# Patient Record
Sex: FEMALE | Race: WHITE | NOT HISPANIC OR LATINO | Employment: FULL TIME | URBAN - METROPOLITAN AREA
[De-identification: names, ages, dates, MRNs, and addresses within clinical notes are randomized per-mention and may not be internally consistent; named-entity substitution may affect disease eponyms.]

---

## 2023-02-20 ENCOUNTER — EVALUATION (OUTPATIENT)
Dept: PHYSICAL THERAPY | Facility: CLINIC | Age: 53
End: 2023-02-20

## 2023-02-20 DIAGNOSIS — M54.41 CHRONIC MIDLINE LOW BACK PAIN WITH RIGHT-SIDED SCIATICA: Primary | ICD-10-CM

## 2023-02-20 DIAGNOSIS — G89.29 CHRONIC MIDLINE LOW BACK PAIN WITH RIGHT-SIDED SCIATICA: Primary | ICD-10-CM

## 2023-02-20 NOTE — LETTER
2023    Rebeka Ellison MD  [address] [facility] [address]    Patient: Sammy Felton   YOB: 1970   Date of Visit: 2023     Encounter Diagnosis     ICD-10-CM    1  Chronic midline low back pain with right-sided sciatica  M54 41     G89 29           Dear Dr Don Wood: Thank you for your recent referral of Sammy Felton  Please review the attached evaluation summary from  [facility] recent visit  Please verify that you agree with the plan of care by signing the attached order  If you have any questions or concerns, please do not hesitate to call  I sincerely appreciate the opportunity to share in the care of one of your patients and hope to have another opportunity to work with you in the near future  Sincerely,    Brandie Bahena, PT, DPT       Referring Provider:      I certify that I have read the below Plan of Care and certify the need for these services furnished under this plan of treatment while under my care  Rebeka Ellison MD  [address] [facility] [address]  Via Fax: [phone]          PT Evaluation     Today's date: 2023  Patient name: Sammy Felton  : 1970  MRN: [id number]  Referring provider: Michaela Marie MD  Dx:   Encounter Diagnosis     ICD-10-CM    1  Chronic midline low back pain with right-sided sciatica  M54 41     G89 29           Start Time: 1715  Stop Time: 1800  Total time in clinic (min): 45 minutes    Assessment  Assessment details: Pt is a 47 y o  female presenting to PT services with c/o chronic LBP  Differential diagnosis list includes: lumbar radiculopathy vs  spinal stenosis  Pt responded favorably to repeated forward flexion with decreased intensity of pain and centralization of symptoms to L thigh from L shin  Pt has weakness in posterior chain of BLE  Pt has difficulty activating transverse abdominis without compensation of diaphragm   PT added repeated lumbar flexion, supine double knee to chest, and TA contraction to pt's HEP, pt verbalized and demonstrated understanding of proper form  Pt is a good candidate for skilled physical therapy in order to improve low back pain, resolve radicular symptoms, and increase functional ability  Impairments: abnormal or restricted ROM, activity intolerance, impaired balance, impaired physical strength, lacks appropriate home exercise program and pain with function  Barriers to therapy: High co-pay    Goals  STG (3 weeks):  1  Pt will improve lumbar AROM to be WNL  2  Pt will improve b/l hip extension strength to be at least 4+/5  3  Pt will improve b/l knee flexion strength to be at least 4+/5   4  Pt will report no radicular symptoms past R knee  LTG (6 weeks):  1  Pt will be independent in HEP  2  Pt will report no radicular symptoms  3  Pt will improve TA activation evidenced by palpable contraction while breathing  4  Pt will report pain at worst as 3/10 or less     Plan  Patient would benefit from: skilled physical therapy  Planned modality interventions: biofeedback, cryotherapy, TENS, thermotherapy: hydrocollator packs and unattended electrical stimulation  Other planned modality interventions: IASTM, MFDc  Planned therapy interventions: abdominal trunk stabilization, joint mobilization, massage, manual therapy, neuromuscular re-education, patient education, strengthening, stretching, therapeutic activities, therapeutic exercise, home exercise program, functional ROM exercises, flexibility and balance  Frequency: 1x week  Duration in weeks: 6  Plan of Care beginning date: 2/20/2023  Plan of Care expiration date: 4/7/2023  Treatment plan discussed with: patient        Subjective Evaluation    History of Present Illness  Mechanism of injury: Pt reports that she is taking Gabapentin currently for the pain she is experiencing, she only takes it at night  Pt states that she has pain in the R leg   Pt states that she has had this pain since , she cannot cite an inciting incident  She has had this pain before but has not had it for years  She states that any time she has had this before, it has lasted for only 2-3 days after taking the Medrol dose pack and this time it is not going away  She has had back issues for about 12 years  She states that the pain is better on the weekends and in the morning because she sits for a long period of time at work  She has had lumbar epidural injections in 2017 and it seemed to help a lot      Pain  Current pain ratin  At best pain ratin  At worst pain ratin  Location: R shin, R thigh, R hip  Quality: dull ache, burning and needle-like  Relieving factors: ice and support (Gabapentin, ibuprofen, pillow between legs)  Exacerbated by: prolonged sitting     Social Support  Steps to enter house: no  Stairs in house: yes (12 steps up, 1 flight down)   Lives in: multiple-level home  Lives with: spouse (mother in law)    Employment status: working ( at [employer])  Hand dominance: right    Patient Goals  Patient goals for therapy: decreased pain, improved balance and increased strength  Patient goal: "to get the MRI, to make the pain go away "         Objective     Joint Play     Hypomobile: T8, T9, T10, T11, T12, L1, L2, L3, L4, L5 and S1     Pain: L1, L2, L3, L4 and L5   Mechanical Assessment    Cervical      Thoracic    Seated flexion: repeated movements  Pain location: centralized  Pain intensity: better    Lumbar    Standing extension: repeated movements  Pain intensity: worse    Strength/Myotome Testing     Left Hip   Planes of Motion   Flexion: 4+  Extension: 4  Abduction: 5    Right Hip   Planes of Motion   Flexion: 4+  Extension: 4  Abduction: 5    Left Knee   Flexion: 5  Extension: 5    Right Knee   Flexion: 5  Extension: 5    Left Ankle/Foot   Dorsiflexion: 5  Plantar flexion: 5    Right Ankle/Foot   Dorsiflexion: 5  Plantar flexion: 5            Precautions: None  Access Code: [email or web address]       Manuals 2/20                                                                Neuro Re-Ed             TA contraction 5"x20                                                                                          Ther Ex             Bike              Repeated lumbar flexion x30            DKTC 3x10                                                                             Ther Activity                                       Gait Training                                       Modalities

## 2023-02-20 NOTE — PROGRESS NOTES
PT Evaluation     Today's date: 2023  Patient name: Mansi English  : 1970  MRN: [id number]  Referring provider: Vijay Chaves MD  Dx:   Encounter Diagnosis     ICD-10-CM    1  Chronic midline low back pain with right-sided sciatica  M54 41     G89 29           Start Time: 1715  Stop Time: 1800  Total time in clinic (min): 45 minutes    Assessment  Assessment details: Pt is a 47 y o  female presenting to PT services with c/o chronic LBP  Differential diagnosis list includes: lumbar radiculopathy vs  spinal stenosis  Pt responded favorably to repeated forward flexion with decreased intensity of pain and centralization of symptoms to L thigh from L shin  Pt has weakness in posterior chain of BLE  Pt has difficulty activating transverse abdominis without compensation of diaphragm  PT added repeated lumbar flexion, supine double knee to chest, and TA contraction to pt's HEP, pt verbalized and demonstrated understanding of proper form  Pt is a good candidate for skilled physical therapy in order to improve low back pain, resolve radicular symptoms, and increase functional ability  Impairments: abnormal or restricted ROM, activity intolerance, impaired balance, impaired physical strength, lacks appropriate home exercise program and pain with function  Barriers to therapy: High co-pay    Goals  STG (3 weeks):  1  Pt will improve lumbar AROM to be WNL  2  Pt will improve b/l hip extension strength to be at least 4+/5  3  Pt will improve b/l knee flexion strength to be at least 4+/5   4  Pt will report no radicular symptoms past R knee  LTG (6 weeks):  1  Pt will be independent in HEP  2  Pt will report no radicular symptoms  3  Pt will improve TA activation evidenced by palpable contraction while breathing  4   Pt will report pain at worst as 3/10 or less     Plan  Patient would benefit from: skilled physical therapy  Planned modality interventions: biofeedback, cryotherapy, TENS, thermotherapy: hydrocollator packs and unattended electrical stimulation  Other planned modality interventions: IASTM, MFDc  Planned therapy interventions: abdominal trunk stabilization, joint mobilization, massage, manual therapy, neuromuscular re-education, patient education, strengthening, stretching, therapeutic activities, therapeutic exercise, home exercise program, functional ROM exercises, flexibility and balance  Frequency: 1x week  Duration in weeks: 6  Plan of Care beginning date: 2023  Plan of Care expiration date: 2023  Treatment plan discussed with: patient        Subjective Evaluation    History of Present Illness  Mechanism of injury: Pt reports that she is taking Gabapentin currently for the pain she is experiencing, she only takes it at night  Pt states that she has pain in the R leg  Pt states that she has had this pain since , she cannot cite an inciting incident  She has had this pain before but has not had it for years  She states that any time she has had this before, it has lasted for only 2-3 days after taking the Medrol dose pack and this time it is not going away  She has had back issues for about 12 years  She states that the pain is better on the weekends and in the morning because she sits for a long period of time at work  She has had lumbar epidural injections in 2017 and it seemed to help a lot      Pain  Current pain ratin  At best pain ratin  At worst pain ratin  Location: R shin, R thigh, R hip  Quality: dull ache, burning and needle-like  Relieving factors: ice and support (Gabapentin, ibuprofen, pillow between legs)  Exacerbated by: prolonged sitting     Social Support  Steps to enter house: no  Stairs in house: yes (12 steps up, 1 flight down)   Lives in: multiple-level home  Lives with: spouse (mother in law)    Employment status: working ( at [employer])  Hand dominance: right    Patient Goals  Patient goals for therapy: decreased pain, improved balance and increased strength  Patient goal: "to get the MRI, to make the pain go away "         Objective     Joint Play     Hypomobile: T8, T9, T10, T11, T12, L1, L2, L3, L4, L5 and S1     Pain: L1, L2, L3, L4 and L5   Mechanical Assessment    Cervical      Thoracic    Seated flexion: repeated movements  Pain location: centralized  Pain intensity: better    Lumbar    Standing extension: repeated movements  Pain intensity: worse    Strength/Myotome Testing     Left Hip   Planes of Motion   Flexion: 4+  Extension: 4  Abduction: 5    Right Hip   Planes of Motion   Flexion: 4+  Extension: 4  Abduction: 5    Left Knee   Flexion: 5  Extension: 5    Right Knee   Flexion: 5  Extension: 5    Left Ankle/Foot   Dorsiflexion: 5  Plantar flexion: 5    Right Ankle/Foot   Dorsiflexion: 5  Plantar flexion: 5             Precautions: None  Access Code: [email or web address]       Manuals 2/20                                                                Neuro Re-Ed             TA contraction 5"x20                                                                                          Ther Ex             Bike              Repeated lumbar flexion x30            DKTC 3x10                                                                             Ther Activity                                       Gait Training                                       Modalities

## 2023-02-28 ENCOUNTER — OFFICE VISIT (OUTPATIENT)
Dept: PHYSICAL THERAPY | Facility: CLINIC | Age: 53
End: 2023-02-28

## 2023-02-28 DIAGNOSIS — G89.29 CHRONIC MIDLINE LOW BACK PAIN WITH RIGHT-SIDED SCIATICA: Primary | ICD-10-CM

## 2023-02-28 DIAGNOSIS — M54.41 CHRONIC MIDLINE LOW BACK PAIN WITH RIGHT-SIDED SCIATICA: Primary | ICD-10-CM

## 2023-02-28 NOTE — PROGRESS NOTES
Daily Note     Today's date: 2023  Patient name: Manny Alarcon  : 1970  MRN: [id number]  Referring provider: Jhony Walls MD  Dx:   Encounter Diagnosis     ICD-10-CM    1  Chronic midline low back pain with right-sided sciatica  M54 41     G89 29                      Subjective: Pt denies pain in low back upon arrival to session  She reports she has difficulty with TA contraction at this time  Objective: See treatment diary below      Assessment: Poor TA contraction present  Requires VC to facilitate proper form  Pt completes charted exercises without c/o pain or discomfort  Dizziness present as pt transferred supine to stand but this decreased with time  Monitor response to tx nv  Plan: Continue per plan of care        Precautions: None  Access Code: [email or web address]       Manuals                                                                Neuro Re-Ed             TA contraction 5"x20 3"x10           DLS PB press  5"Z31           PPT  3"x20           TA+bridge  2x10                                                  Ther Ex             Bike   5'           Repeated lumbar flexion x30            DKTC 3x10 3x10           SKTC  10"x10           clamshells  x20           Standing hip abd/ext  RTB 2x10                                     Ther Activity                                       Gait Training                                       Modalities

## 2023-03-09 NOTE — PROGRESS NOTES
PT Discharge    Per phone call from pt, she is feeling better and will continue with HEP independently  Pt was informed that if she has any questions or concerns she is welcome to contact the facility at any time  Objective and goals were unable to be updated  Pt is discharged from skilled physical therapy at this time